# Patient Record
Sex: FEMALE | Race: WHITE
[De-identification: names, ages, dates, MRNs, and addresses within clinical notes are randomized per-mention and may not be internally consistent; named-entity substitution may affect disease eponyms.]

---

## 2019-07-08 ENCOUNTER — HOSPITAL ENCOUNTER (OUTPATIENT)
Dept: HOSPITAL 95 - MHTC | Age: 64
Discharge: HOME | End: 2019-07-08
Attending: INTERNAL MEDICINE
Payer: MEDICARE

## 2019-07-08 VITALS — WEIGHT: 260.15 LBS | BODY MASS INDEX: 47.27 KG/M2 | HEIGHT: 62.01 IN

## 2019-07-08 DIAGNOSIS — R60.9: ICD-10-CM

## 2019-07-08 DIAGNOSIS — I10: ICD-10-CM

## 2019-07-08 DIAGNOSIS — I48.1: Primary | ICD-10-CM

## 2019-07-08 DIAGNOSIS — E66.01: ICD-10-CM

## 2019-07-08 DIAGNOSIS — Z79.01: ICD-10-CM

## 2019-07-08 DIAGNOSIS — Z79.899: ICD-10-CM

## 2019-07-08 DIAGNOSIS — Z88.0: ICD-10-CM

## 2019-07-08 DIAGNOSIS — R79.89: ICD-10-CM

## 2019-07-08 DIAGNOSIS — Z88.2: ICD-10-CM

## 2019-07-08 DIAGNOSIS — E78.5: ICD-10-CM

## 2019-07-08 DIAGNOSIS — E87.6: ICD-10-CM

## 2019-07-08 PROCEDURE — 5A2204Z RESTORATION OF CARDIAC RHYTHM, SINGLE: ICD-10-PCS | Performed by: INTERNAL MEDICINE

## 2019-09-16 ENCOUNTER — HOSPITAL ENCOUNTER (OUTPATIENT)
Dept: HOSPITAL 95 - MHTC | Age: 64
Discharge: HOME | End: 2019-09-16
Attending: INTERNAL MEDICINE
Payer: MEDICARE

## 2019-09-16 VITALS — HEIGHT: 60 IN | WEIGHT: 244.71 LBS | BODY MASS INDEX: 48.04 KG/M2

## 2019-09-16 DIAGNOSIS — Z79.899: ICD-10-CM

## 2019-09-16 DIAGNOSIS — Z88.0: ICD-10-CM

## 2019-09-16 DIAGNOSIS — E66.01: ICD-10-CM

## 2019-09-16 DIAGNOSIS — I48.1: Primary | ICD-10-CM

## 2019-09-16 DIAGNOSIS — I11.9: ICD-10-CM

## 2019-09-16 DIAGNOSIS — E78.5: ICD-10-CM

## 2019-09-16 DIAGNOSIS — Z88.2: ICD-10-CM

## 2019-09-16 NOTE — NUR
PT AND S/O VERBALIZES UNDERSTANDING WRITTEN AND VERBAL ORDERS.VSS. PT
MAGNESIUM GTT COMPLETED. PT DRESSES SELF WITHOUT DIFF. PT IV DC'D. CATH
INTACT. PRESSURE DSG IN PLACE. NO BLEEDING OR HEMATOMA NOTED. PT DC TO HOME
VIA S/O BY ALL.